# Patient Record
Sex: FEMALE | Race: WHITE | HISPANIC OR LATINO
[De-identification: names, ages, dates, MRNs, and addresses within clinical notes are randomized per-mention and may not be internally consistent; named-entity substitution may affect disease eponyms.]

---

## 2020-06-01 ENCOUNTER — RESULT REVIEW (OUTPATIENT)
Age: 45
End: 2020-06-01

## 2020-07-01 PROBLEM — Z00.00 ENCOUNTER FOR PREVENTIVE HEALTH EXAMINATION: Status: ACTIVE | Noted: 2020-07-01

## 2020-07-06 ENCOUNTER — APPOINTMENT (OUTPATIENT)
Dept: PULMONOLOGY | Facility: CLINIC | Age: 45
End: 2020-07-06
Payer: COMMERCIAL

## 2020-07-06 VITALS
WEIGHT: 127 LBS | BODY MASS INDEX: 23.98 KG/M2 | SYSTOLIC BLOOD PRESSURE: 96 MMHG | OXYGEN SATURATION: 97 % | RESPIRATION RATE: 16 BRPM | DIASTOLIC BLOOD PRESSURE: 61 MMHG | HEIGHT: 61 IN | HEART RATE: 77 BPM

## 2020-07-06 DIAGNOSIS — Z87.42 PERSONAL HISTORY OF OTHER DISEASES OF THE FEMALE GENITAL TRACT: ICD-10-CM

## 2020-07-06 DIAGNOSIS — Z87.39 PERSONAL HISTORY OF OTHER DISEASES OF THE MUSCULOSKELETAL SYSTEM AND CONNECTIVE TISSUE: ICD-10-CM

## 2020-07-06 DIAGNOSIS — R76.12 NONSPECIFIC REACTION TO CELL MEDIATED IMMUNITY MEASUREMENT OF GAMMA INTERFERON ANTIGEN RESPONSE W/OUT ACTIVE TUBERCULOSIS: ICD-10-CM

## 2020-07-06 DIAGNOSIS — Z01.811 ENCOUNTER FOR PREPROCEDURAL RESPIRATORY EXAMINATION: ICD-10-CM

## 2020-07-06 DIAGNOSIS — Z78.9 OTHER SPECIFIED HEALTH STATUS: ICD-10-CM

## 2020-07-06 DIAGNOSIS — G43.909 MIGRAINE, UNSPECIFIED, NOT INTRACTABLE, W/OUT STATUS MIGRAINOSUS: ICD-10-CM

## 2020-07-06 PROCEDURE — 99203 OFFICE O/P NEW LOW 30 MIN: CPT

## 2020-07-06 RX ORDER — ALENDRONATE SODIUM 70 MG/1
70 TABLET ORAL
Qty: 4 | Refills: 0 | Status: ACTIVE | COMMUNITY
Start: 2020-06-12

## 2020-07-06 RX ORDER — UBIDECARENONE/VIT E ACET 100MG-5
CAPSULE ORAL
Refills: 0 | Status: ACTIVE | COMMUNITY

## 2020-07-06 RX ORDER — NORTRIPTYLINE HYDROCHLORIDE 10 MG/1
10 CAPSULE ORAL
Qty: 30 | Refills: 0 | Status: ACTIVE | COMMUNITY
Start: 2020-06-09

## 2020-07-06 RX ORDER — PNV NO.95/FERROUS FUM/FOLIC AC 28MG-0.8MG
TABLET ORAL
Refills: 0 | Status: ACTIVE | COMMUNITY

## 2020-07-06 RX ORDER — SUMATRIPTAN 50 MG/1
50 TABLET, FILM COATED ORAL
Qty: 9 | Refills: 0 | Status: ACTIVE | COMMUNITY
Start: 2020-05-15

## 2020-07-06 RX ORDER — OXYCODONE AND ACETAMINOPHEN 5; 325 MG/1; MG/1
5-325 TABLET ORAL
Qty: 14 | Refills: 0 | Status: ACTIVE | COMMUNITY
Start: 2020-04-15

## 2020-07-06 RX ORDER — OMEPRAZOLE 40 MG/1
40 CAPSULE, DELAYED RELEASE ORAL
Refills: 0 | Status: ACTIVE | COMMUNITY

## 2020-07-06 NOTE — CONSULT LETTER
[Dear  ___] : Dear  [unfilled], [Courtesy Letter:] : I had the pleasure of seeing your patient, [unfilled], in my office today. [Consult Closing:] : Thank you very much for allowing me to participate in the care of this patient.  If you have any questions, please do not hesitate to contact me. [Please see my note below.] : Please see my note below. [DrHitesh  ___] : Dr. MURILLO [Sincerely,] : Sincerely,

## 2020-07-06 NOTE — REASON FOR VISIT
[Consultation] : a consultation [Latent TB/ +PPD/ +IGRA] : latent TB/ +PPD/ +IGRA [Pre-op Risk Stratification] : pre-op risk stratification

## 2020-07-06 NOTE — HISTORY OF PRESENT ILLNESS
[TextBox_4] : 44 yo female quatiferon positive  presents for evaluation prior to ovarian cyst MRI and surgery. The patient was quantiferon positive last year and three years ago, refusing to be treated. Last chest xray performed at [facility] last year was "normal" as per the patient. She denies fever, cough, night sweats , weight loss or hemoptysis.

## 2020-07-06 NOTE — PHYSICAL EXAM
[No Acute Distress] : no acute distress [Normal Oropharynx] : normal oropharynx [No Neck Mass] : no neck mass [Normal Appearance] : normal appearance [Normal Rate/Rhythm] : normal rate/rhythm [No Resp Distress] : no resp distress [Normal S1, S2] : normal s1, s2 [No Murmurs] : no murmurs [Clear to Auscultation Bilaterally] : clear to auscultation bilaterally [Benign] : benign [No Abnormalities] : no abnormalities [Normal Gait] : normal gait [No Cyanosis] : no cyanosis [No Clubbing] : no clubbing [FROM] : FROM [No Edema] : no edema [No Focal Deficits] : no focal deficits [Normal Color/ Pigmentation] : normal color/ pigmentation [Normal Affect] : normal affect [Oriented x3] : oriented x3

## 2020-07-06 NOTE — DISCUSSION/SUMMARY
[FreeTextEntry1] : 44 yo female with positive quantiferon refusing INH treatment. The risks and benefits were discussed with the patient and her  who was present, and both understand. A repeat chest xray is indicated prior to continued evaluation with contrast MRI with steroid pretreatment and possible surgery. She may proceed with the procedures as planned despite her positive quantiferon, if the chest xray results are negative for acute disease. She is to follow up with her GYN and PMD as before.\par \par \par \par \par \par

## 2023-01-14 ENCOUNTER — EMERGENCY (EMERGENCY)
Facility: HOSPITAL | Age: 48
LOS: 1 days | Discharge: HOME | End: 2023-01-14
Attending: EMERGENCY MEDICINE
Payer: COMMERCIAL

## 2023-01-14 VITALS
SYSTOLIC BLOOD PRESSURE: 100 MMHG | OXYGEN SATURATION: 97 % | HEART RATE: 67 BPM | TEMPERATURE: 98 F | RESPIRATION RATE: 18 BRPM | DIASTOLIC BLOOD PRESSURE: 68 MMHG

## 2023-01-14 VITALS
RESPIRATION RATE: 18 BRPM | OXYGEN SATURATION: 97 % | DIASTOLIC BLOOD PRESSURE: 74 MMHG | HEART RATE: 75 BPM | TEMPERATURE: 99 F | WEIGHT: 136.69 LBS | SYSTOLIC BLOOD PRESSURE: 109 MMHG | HEIGHT: 61 IN

## 2023-01-14 PROCEDURE — 99284 EMERGENCY DEPT VISIT MOD MDM: CPT

## 2023-01-14 PROCEDURE — 72100 X-RAY EXAM L-S SPINE 2/3 VWS: CPT | Mod: 26

## 2023-01-14 PROCEDURE — 72100 X-RAY EXAM L-S SPINE 2/3 VWS: CPT

## 2023-01-14 PROCEDURE — 72170 X-RAY EXAM OF PELVIS: CPT

## 2023-01-14 PROCEDURE — 73502 X-RAY EXAM HIP UNI 2-3 VIEWS: CPT

## 2023-01-14 PROCEDURE — 73502 X-RAY EXAM HIP UNI 2-3 VIEWS: CPT | Mod: 26,LT

## 2023-01-14 RX ORDER — LIDOCAINE 4 G/100G
1 CREAM TOPICAL
Qty: 7 | Refills: 0
Start: 2023-01-14 | End: 2023-01-20

## 2023-01-14 RX ORDER — LIDOCAINE 4 G/100G
1 CREAM TOPICAL ONCE
Refills: 0 | Status: COMPLETED | OUTPATIENT
Start: 2023-01-14 | End: 2023-01-14

## 2023-01-14 RX ORDER — IBUPROFEN 200 MG
600 TABLET ORAL ONCE
Refills: 0 | Status: COMPLETED | OUTPATIENT
Start: 2023-01-14 | End: 2023-01-14

## 2023-01-14 RX ADMIN — Medication 600 MILLIGRAM(S): at 10:35

## 2023-01-14 RX ADMIN — Medication 600 MILLIGRAM(S): at 11:41

## 2023-01-14 RX ADMIN — LIDOCAINE 1 PATCH: 4 CREAM TOPICAL at 11:41

## 2023-01-14 NOTE — ED PROVIDER NOTE - PROGRESS NOTE DETAILS
Siomara Logan DO (PGY2): X-ray negative for acute fracture.  Patient ambulating in ED.  Patient symptoms improved with medication.  Plan for discharge with PCP follow-up Pt made aware of lab and imaging results. Questions regarding their symptoms were addressed. Advised to follow up with pcp. Given strict return precautions. Pt verbalized understanding. .

## 2023-01-14 NOTE — ED PROVIDER NOTE - PHYSICAL EXAMINATION
GENERAL: AAOx4, GCS 15, NAD, WDWN   HEENT: MMM, no jugular venous distension, supple neck, PERRLA, EOMI, nonicteric sclera  PULM: CTA B, no crackles/rubs/rales  CV: RRR, S1S2, no MRG  ABD: Flat abdomen, NTND, no R/G/R, no CVAT.    MSK: ESQUIVEL, +2 pulses x4.  Mild midline lower L spine tenderness to palpation without stepoff.  +L paralumbar tenderness to palpation and spasm.  +TTP at L buttok and L posterolateral hip.  Able to range hip though with some pain.  No limitations to range of motion.  No pain at thigh, knee, ankle.  NVI.  Compartments soft.    NEURO: No obvious focal deficits  PSYCH: AAOx3, clear thought and normal sensorium

## 2023-01-14 NOTE — ED PROVIDER NOTE - CLINICAL SUMMARY MEDICAL DECISION MAKING FREE TEXT BOX
Mechanical fall with left hip and lower back injury.  Fracture seems unlikely but given location of pain patient will need x-ray of lumbar spine and left hip/pelvis.  No concern for syncope/seizure related to fall.  [place]/Nexus criteria negative for imaging.  Nontoxic appearing.  No concern for intraabdominal or RP trauma.

## 2023-01-14 NOTE — ED PROVIDER NOTE - NSFOLLOWUPINSTRUCTIONS_ED_ALL_ED_FT
Please follow-up with your primary care doctor within 1 week, if your pain persist you may need physical therapy prescription.  For pain you may take acetaminophen 1000 mg every 6 hours and Motrin 400 mg every 6 hours as needed.  You may leave the lidocaine patch on for 12 hours out of 24 hours.  Do not leave the lidocaine patch on for longer than 12 hours.  Lidocaine patch was sent to your pharmacy please use as prescribed.  A work note is provided.    Fall Prevention    WHAT YOU NEED TO KNOW:    Fall prevention includes ways to make your home and other areas safer. It also includes ways you can move more carefully to prevent a fall. Health conditions that cause changes in your blood pressure, vision, or muscle strength and coordination may increase your risk for falls. Medicines may also increase your risk for falls if they make you dizzy, weak, or sleepy.     DISCHARGE INSTRUCTIONS:    Call 911 or have someone else call if:     You have fallen and are unconscious.      You have fallen and cannot move part of your body.    Contact your healthcare provider if:     You have fallen and have pain or a headache.      You have questions or concerns about your condition or care.    Fall prevention tips:     Stand or sit up slowly. This may help you keep your balance and prevent falls.      Use assistive devices as directed. Your healthcare provider may suggest that you use a cane or walker to help you keep your balance. You may need to have grab bars put in your bathroom near the toilet or in the shower.      Wear shoes that fit well and have soles that . Wear shoes both inside and outside. Use slippers with good . Do not wear shoes with high heels.      Wear a personal alarm. This is a device that allows you to call 911 if you fall and need help. Ask your healthcare provider for more information.      Stay active. Exercise can help strengthen your muscles and improve your balance. Your healthcare provider may recommend water aerobics or walking. He or she may also recommend physical therapy to improve your coordination. Never start an exercise program without talking to your healthcare provider first.       Manage your medical conditions. Keep all appointments with your healthcare providers. Visit your eye doctor as directed.    Home safety tips:     Add items to prevent falls in the bathroom. Put nonslip strips on your bath or shower floor to prevent you from slipping. Use a bath mat if you do not have carpet in the bathroom. This will prevent you from falling when you step out of the bath or shower. Use a shower seat so you do not need to stand while you shower. Sit on the toilet or a chair in your bathroom to dry yourself and put on clothing. This will prevent you from losing your balance from drying or dressing yourself while you are standing.       Keep paths clear. Remove books, shoes, and other objects from walkways and stairs. Place cords for telephones and lamps out of the way so that you do not need to walk over them. Tape them down if you cannot move them. Remove small rugs. If you cannot remove a rug, secure it with double-sided tape. This will prevent you from tripping.       Install bright lights in your home. Use night lights to help light paths to the bathroom or kitchen. Always turn on the light before you start walking.      Keep items you use often on shelves within reach. Do not use a step stool to help you reach an item.      Paint or place reflective tape on the edges of your stairs. This will help you see the stairs better.    Follow up with your healthcare provider as directed: Write down your questions so you remember to ask them during your visits.

## 2023-01-14 NOTE — ED PROVIDER NOTE - OBJECTIVE STATEMENT
47-year-old woman with a history of osteoporosis and dyslipidemia with left hip pain after fall from approximately 3 feet in the air.  Patient states she was sleeping.  Rolled out of bed.  Fell to the ground.  She is is able to ambulate but with pain.  Pain is primarily left lower back and left hip.  It does not radiate.  There are no injuries otherwise.  She has no pain in her head or neck.  No chest pain.  No upper back pain.  No abdominal pain.  Has taken only Tylenol for pain.  She has no paresthesias or weakness in her extremities.  No lightheadedness, chest pain, difficulty breathing prior to or after her fall.

## 2023-01-14 NOTE — ED PROVIDER NOTE - PATIENT PORTAL LINK FT
You can access the FollowMyHealth Patient Portal offered by [facility] by registering at the following website: [email or web address]. By joining [employer]’s FollowMyHealth portal, you will also be able to view your health information using other applications (apps) compatible with our system.

## 2023-08-16 ENCOUNTER — APPOINTMENT (OUTPATIENT)
Dept: OBGYN | Facility: CLINIC | Age: 48
End: 2023-08-16

## 2023-09-15 ENCOUNTER — APPOINTMENT (OUTPATIENT)
Dept: OBGYN | Facility: CLINIC | Age: 48
End: 2023-09-15

## 2023-11-07 PROBLEM — E78.5 HYPERLIPIDEMIA, UNSPECIFIED: Chronic | Status: ACTIVE | Noted: 2023-01-14

## 2023-11-07 PROBLEM — M81.0 AGE-RELATED OSTEOPOROSIS WITHOUT CURRENT PATHOLOGICAL FRACTURE: Chronic | Status: ACTIVE | Noted: 2023-01-14

## 2023-11-15 ENCOUNTER — APPOINTMENT (OUTPATIENT)
Dept: OBGYN | Facility: CLINIC | Age: 48
End: 2023-11-15

## 2023-11-26 ENCOUNTER — NON-APPOINTMENT (OUTPATIENT)
Age: 48
End: 2023-11-26

## 2023-11-28 ENCOUNTER — NON-APPOINTMENT (OUTPATIENT)
Age: 48
End: 2023-11-28

## 2024-03-21 ENCOUNTER — NON-APPOINTMENT (OUTPATIENT)
Age: 49
End: 2024-03-21

## 2024-03-24 ENCOUNTER — EMERGENCY (EMERGENCY)
Facility: HOSPITAL | Age: 49
LOS: 1 days | Discharge: HOME | End: 2024-03-24
Attending: EMERGENCY MEDICINE
Payer: COMMERCIAL

## 2024-03-24 VITALS
TEMPERATURE: 97 F | WEIGHT: 127.87 LBS | DIASTOLIC BLOOD PRESSURE: 76 MMHG | HEIGHT: 62 IN | OXYGEN SATURATION: 96 % | HEART RATE: 89 BPM | SYSTOLIC BLOOD PRESSURE: 104 MMHG | RESPIRATION RATE: 18 BRPM

## 2024-03-24 VITALS
OXYGEN SATURATION: 99 % | RESPIRATION RATE: 18 BRPM | DIASTOLIC BLOOD PRESSURE: 70 MMHG | TEMPERATURE: 98 F | SYSTOLIC BLOOD PRESSURE: 111 MMHG | HEART RATE: 74 BPM

## 2024-03-24 LAB
FLUAV AG NPH QL: SIGNIFICANT CHANGE UP
FLUBV AG NPH QL: SIGNIFICANT CHANGE UP
RSV RNA NPH QL NAA+NON-PROBE: SIGNIFICANT CHANGE UP
SARS-COV-2 RNA SPEC QL NAA+PROBE: SIGNIFICANT CHANGE UP

## 2024-03-24 PROCEDURE — 99284 EMERGENCY DEPT VISIT MOD MDM: CPT | Mod: 25

## 2024-03-24 PROCEDURE — 94640 AIRWAY INHALATION TREATMENT: CPT

## 2024-03-24 PROCEDURE — 87637 SARSCOV2&INF A&B&RSV AMP PRB: CPT

## 2024-03-24 PROCEDURE — 99283 EMERGENCY DEPT VISIT LOW MDM: CPT | Mod: 25

## 2024-03-24 RX ORDER — ALBUTEROL 90 UG/1
2 AEROSOL, METERED ORAL ONCE
Refills: 0 | Status: COMPLETED | OUTPATIENT
Start: 2024-03-24 | End: 2024-03-24

## 2024-03-24 RX ORDER — ACETAMINOPHEN 500 MG
1000 TABLET ORAL ONCE
Refills: 0 | Status: COMPLETED | OUTPATIENT
Start: 2024-03-24 | End: 2024-03-24

## 2024-03-24 RX ADMIN — Medication 1000 MILLIGRAM(S): at 07:45

## 2024-03-24 RX ADMIN — ALBUTEROL 2 PUFF(S): 90 AEROSOL, METERED ORAL at 07:45

## 2024-03-24 NOTE — ED ADULT NURSE NOTE - NSFALLUNIVINTERV_ED_ALL_ED
Bed/Stretcher in lowest position, wheels locked, appropriate side rails in place/Call bell, personal items and telephone in reach/Instruct patient to call for assistance before getting out of bed/chair/stretcher/Non-slip footwear applied when patient is off stretcher/[place] to call system/Physically safe environment - no spills, clutter or unnecessary equipment/Purposeful proactive rounding/Room/bathroom lighting operational, light cord in reach

## 2024-03-24 NOTE — ED PROVIDER NOTE - NSFOLLOWUPINSTRUCTIONS_ED_ALL_ED_FT
1. You presented to the emergency department for: upper viral illness. Please see the attached information sheet for more information regarding upper respiratory viral illness. There is no medication that will make all your symptoms disappear. Gold standard treatment for viral illness is rest, hydration, and other supportive care including tylenol for discomfort/pain.    2. Your evaluation in the emergency department included a physician evaluation. Your work-up did not reveal any findings indicating the need for admission to the hospital or any emergent interventions at this time.     3. It is recommended that you follow-up with your primary care doctor for a repeat evaluation within the next 1-2 weeks.     If needed, to arrange an appointment with a primary care provider please call: [phone]    4. Please continue taking your regular medications as prescribed.     For pain you may take 400-600 mg IBUPROFEN or 500-1000mg ACETAMINOPHEN every 6-8 hours - as needed.  This is an over-the-counter medication - please read the instructions for use and warnings on the label. If you have any questions regarding its use, you may refer them to your local pharmacist.    5. PLEASE RETURN TO THE EMERGENCY DEPARTMENT IMMEDIATELY IF you develop any fevers not responding to over the counter medications, uncontrollable nausea and vomiting, an inability to tolerate eating and drinking, difficulty breathing, chest pain, a severe increase in your symptoms or pain, or any other new symptoms that concern you.

## 2024-03-24 NOTE — ED ADULT TRIAGE NOTE - CHIEF COMPLAINT QUOTE
fever, cough, body aches, ear ache, headache, vomit x 3 days; seen in urgent care given Fluticasone and Benzonatate; no improvement

## 2024-03-24 NOTE — ED PROVIDER NOTE - ATTENDING CONTRIBUTION TO CARE
Attending MD Carlin:  I have seen and examined this patient and fully participated in the care of this patient as the teaching attending. I personally made/approved the management plan and take responsibility for the patient management.      49-year-old woman is presenting for evaluation of 3 days of rhinorrhea cough fever right ear pain.  Patient reports she was seen in urgent care during the initial phase of illness and had a negative COVID swab, she was started on fluticasone and Tessalon Perles, symptoms are not improving with these medications.  Cough is nonproductive, there is no shortness of breath.    Patient's vital signs are within normal limits.  She is sitting up in the stretcher in no apparent distress, breathing comfortably 100% on room air.  Pulmonary examination with clear lungs posteriorly regular heart sounds neck is supple nontender.  Posterior oropharynx without tonsillar hypertrophy exudates or erythema.  Tympanic membranes are clear bilaterally.  There is no mastoid tenderness bilaterally the abdomen is nontender extremities warm and well-perfused moves all extremities spontaneously.    Patient presenting for evaluation of rhinorrhea cough right ear pain for 3 days, she is not immunocompromised.  Symptom complex is consistent with likely viral syndrome.  No evidence of superimposed acute bacterial infection at this time.  Plan to continue supportive care with antitussives analgesia and will obtain screening flu/COVID/RSV swab.      *The above represents an initial assessment/impression. Please refer to progress notes for potential changes in patient clinical course*

## 2024-03-24 NOTE — ED PROVIDER NOTE - CLINICAL SUMMARY MEDICAL DECISION MAKING FREE TEXT BOX
49-year-old female with only daily med HRT presents with complaints of cough, sore throat, congestion, ear pain, myalgias. Patient is extremely well-appearing on exam, speaking in full sentences.  Lungs clear, abdomen soft nontender. Vitals stable  No fluid losses, tolerating p.o. Pt likely has viral URI. Very low suspicion for PNA or other serious bacterial infection in absence of concerning history/exam findings/vitals. Labs/CHX unlikely to lead to any changes in management. Will give tylenol, albuterol. Will educate regarding effective use of tylenol and expectations regarding viral illnesses. Likely dispo home. Will reassess after meds. 49-year-old female with only daily med HRT presents with complaints of cough, rhinorrhea sore throat, congestion, ear pain, myalgias. No fluid losses, tolerating p.o. Patient is extremely well-appearing on exam, speaking in full sentences.  Lungs clear, abdomen soft nontender. Vitals stable, afebrile.   Pt likely has viral URI. Very low suspicion for PNA, bacterial ear infection, or other serious bacterial infection in absence of concerning history/exam findings/vitals. Labs/CHX unlikely to lead to any changes in management. Will give tylenol, albuterol. Will educate regarding effective use of tylenol and expectations regarding viral illnesses. Likely dispo home. Will reassess after meds. 49-year-old female with only daily med HRT presents with complaints of cough, rhinorrhea sore throat, congestion, ear pain, myalgias. No fluid losses, tolerating p.o. Patient is extremely well-appearing on exam, speaking in full sentences.  Lungs clear, abdomen soft nontender. Vitals stable, afebrile.   Pt likely has viral URI. Very low suspicion for PNA, bacterial ear infection, or other serious infection in absence of concerning history/exam findings/vitals. Labs/CHX unlikely to lead to any changes in management. Will give tylenol, albuterol. Will educate regarding effective use of tylenol and expectations regarding viral illnesses. Likely dispo home. Will reassess after meds.

## 2024-03-24 NOTE — ED PROVIDER NOTE - PHYSICAL EXAMINATION
No
Gen: NAD, AAOx3, non-toxic appearing  HEENT: NCAT, normal conjunctiva, oral mucosa moist  Lung: speaking in full sentences, good aeration bilaterally, lungs CTA b/l  CV: regular rate and rhythm. cap refill <2x. peripheral pulses 2+bilaterally   Abd: soft, ND, NT  MSK: no visible deformities  Neuro: No focal deficits  Skin: Intact  Psych: normal affect

## 2024-03-24 NOTE — ED PROVIDER NOTE - PATIENT PORTAL LINK FT
You can access the FollowMyHealth Patient Portal offered by [facility] by registering at the following website: [email or web address]. By joining [employer]’s FollowMyHealth portal, you will also be able to view your health information using other applications (apps) compatible with our system.

## 2024-03-24 NOTE — ED PROVIDER NOTE - PROGRESS NOTE DETAILS
Dacia Servin (PGY1): Reassessed patient and still doing well. Educated regarding tylenol use and expectations for viral illnesses, and how to best maintain supportive care at home. pt understands, is ambulatory, Dacia Servin (PGY1): Reassessed patient and still doing well. Educated regarding tylenol use and expectations for viral illnesses, and how to best maintain supportive care at home. pt understands, is ambulatory, and agreeable to dispo home. Return precautions given.

## 2024-03-24 NOTE — ED ADULT NURSE NOTE - OBJECTIVE STATEMENT
50yo female with pmh of osteopetrosis presents to ED c/o flu like symptoms.  Pt states symptoms started on Friday and endorses bilateral ear pain, sore throat, and body aches. Pt denies, chest pain, and SOB, diarrhea, urinary symptoms, and blood in  stool.  Pt presented to urgent care and tested negative for flu.

## 2024-03-24 NOTE — ED PROVIDER NOTE - OBJECTIVE STATEMENT
49-year-old female with PMH of migraines, TERESA, HLD no daily meds presents to the ED with complaints of cough, sore throat, myalgias since Friday. Despite triage note, patient has had no vomiting.  No diarrhea.  Patient has been tolerating p.o. and has good appetite.  Patient states that she went to urgent care and received fluticasone and benzonatate but has had no improvement in cough.  Patient has only taken 1 Tylenol 1 time on Friday.  She she presents today because cough is still present.  Patient has had no measured fevers at home and has no fevers.  Patient endorsing bilateral ear pain worse on the right side.  Patient denies shortness of breath, difficulty breathing, chest pain, abdominal pain, dysuria. 49-year-old female with PMH of migraines, TERESA, HLD only daily med HRT postmenopause presents to the ED with complaints of cough, sore throat, myalgias since Friday. Despite triage note, patient has had no vomiting.  No diarrhea.  Patient has been tolerating p.o. and has good appetite.  Patient states that she went to urgent care and received fluticasone and benzonatate but has had no improvement in cough.  Patient has only taken 1 Tylenol 1 time on Friday.  She she presents today because cough is still present.  Patient has had no measured fevers at home and has no fevers.  Patient endorsing bilateral ear pain worse on the right side.  Patient denies shortness of breath, difficulty breathing, chest pain, abdominal pain, dysuria, abnormal vaginal discharge.

## 2024-10-11 ENCOUNTER — EMERGENCY (EMERGENCY)
Facility: HOSPITAL | Age: 49
LOS: 1 days | Discharge: HOME | End: 2024-10-11
Attending: EMERGENCY MEDICINE
Payer: COMMERCIAL

## 2024-10-11 VITALS
WEIGHT: 132.28 LBS | DIASTOLIC BLOOD PRESSURE: 86 MMHG | TEMPERATURE: 98 F | HEIGHT: 57 IN | SYSTOLIC BLOOD PRESSURE: 129 MMHG | OXYGEN SATURATION: 99 % | RESPIRATION RATE: 16 BRPM | HEART RATE: 73 BPM

## 2024-10-11 VITALS
TEMPERATURE: 98 F | RESPIRATION RATE: 18 BRPM | SYSTOLIC BLOOD PRESSURE: 110 MMHG | HEART RATE: 61 BPM | DIASTOLIC BLOOD PRESSURE: 75 MMHG | OXYGEN SATURATION: 99 %

## 2024-10-11 LAB
ALBUMIN SERPL ELPH-MCNC: 4.4 G/DL — SIGNIFICANT CHANGE UP (ref 3.3–5)
ALP SERPL-CCNC: 55 U/L — SIGNIFICANT CHANGE UP (ref 40–120)
ALT FLD-CCNC: 9 U/L — LOW (ref 10–45)
ANION GAP SERPL CALC-SCNC: 11 MMOL/L — SIGNIFICANT CHANGE UP (ref 5–17)
APPEARANCE UR: CLEAR — SIGNIFICANT CHANGE UP
AST SERPL-CCNC: 17 U/L — SIGNIFICANT CHANGE UP (ref 10–40)
BACTERIA # UR AUTO: NEGATIVE /HPF — SIGNIFICANT CHANGE UP
BASOPHILS # BLD AUTO: 0.06 K/UL — SIGNIFICANT CHANGE UP (ref 0–0.2)
BASOPHILS NFR BLD AUTO: 0.7 % — SIGNIFICANT CHANGE UP (ref 0–2)
BILIRUB SERPL-MCNC: 0.3 MG/DL — SIGNIFICANT CHANGE UP (ref 0.2–1.2)
BILIRUB UR-MCNC: NEGATIVE — SIGNIFICANT CHANGE UP
BUN SERPL-MCNC: 11 MG/DL — SIGNIFICANT CHANGE UP (ref 7–23)
CALCIUM SERPL-MCNC: 10 MG/DL — SIGNIFICANT CHANGE UP (ref 8.4–10.5)
CAST: 0 /LPF — SIGNIFICANT CHANGE UP (ref 0–4)
CHLORIDE SERPL-SCNC: 106 MMOL/L — SIGNIFICANT CHANGE UP (ref 96–108)
CO2 SERPL-SCNC: 22 MMOL/L — SIGNIFICANT CHANGE UP (ref 22–31)
COLOR SPEC: YELLOW — SIGNIFICANT CHANGE UP
CREAT SERPL-MCNC: 0.66 MG/DL — SIGNIFICANT CHANGE UP (ref 0.5–1.3)
DIFF PNL FLD: ABNORMAL
EGFR: 108 ML/MIN/1.73M2 — SIGNIFICANT CHANGE UP
EOSINOPHIL # BLD AUTO: 0.12 K/UL — SIGNIFICANT CHANGE UP (ref 0–0.5)
EOSINOPHIL NFR BLD AUTO: 1.3 % — SIGNIFICANT CHANGE UP (ref 0–6)
GAS PNL BLDV: SIGNIFICANT CHANGE UP
GLUCOSE SERPL-MCNC: 101 MG/DL — HIGH (ref 70–99)
GLUCOSE UR QL: NEGATIVE MG/DL — SIGNIFICANT CHANGE UP
HCT VFR BLD CALC: 43.1 % — SIGNIFICANT CHANGE UP (ref 34.5–45)
HGB BLD-MCNC: 13.5 G/DL — SIGNIFICANT CHANGE UP (ref 11.5–15.5)
IMM GRANULOCYTES NFR BLD AUTO: 0.2 % — SIGNIFICANT CHANGE UP (ref 0–0.9)
KETONES UR-MCNC: NEGATIVE MG/DL — SIGNIFICANT CHANGE UP
LEUKOCYTE ESTERASE UR-ACNC: NEGATIVE — SIGNIFICANT CHANGE UP
LIDOCAIN IGE QN: 43 U/L — SIGNIFICANT CHANGE UP (ref 7–60)
LYMPHOCYTES # BLD AUTO: 3.52 K/UL — HIGH (ref 1–3.3)
LYMPHOCYTES # BLD AUTO: 39.5 % — SIGNIFICANT CHANGE UP (ref 13–44)
MCHC RBC-ENTMCNC: 30.5 PG — SIGNIFICANT CHANGE UP (ref 27–34)
MCHC RBC-ENTMCNC: 31.3 GM/DL — LOW (ref 32–36)
MCV RBC AUTO: 97.5 FL — SIGNIFICANT CHANGE UP (ref 80–100)
MONOCYTES # BLD AUTO: 0.49 K/UL — SIGNIFICANT CHANGE UP (ref 0–0.9)
MONOCYTES NFR BLD AUTO: 5.5 % — SIGNIFICANT CHANGE UP (ref 2–14)
NEUTROPHILS # BLD AUTO: 4.7 K/UL — SIGNIFICANT CHANGE UP (ref 1.8–7.4)
NEUTROPHILS NFR BLD AUTO: 52.8 % — SIGNIFICANT CHANGE UP (ref 43–77)
NITRITE UR-MCNC: NEGATIVE — SIGNIFICANT CHANGE UP
NRBC # BLD: 0 /100 WBCS — SIGNIFICANT CHANGE UP (ref 0–0)
PH UR: 6 — SIGNIFICANT CHANGE UP (ref 5–8)
PLATELET # BLD AUTO: 276 K/UL — SIGNIFICANT CHANGE UP (ref 150–400)
POTASSIUM SERPL-MCNC: 4.4 MMOL/L — SIGNIFICANT CHANGE UP (ref 3.5–5.3)
POTASSIUM SERPL-SCNC: 4.4 MMOL/L — SIGNIFICANT CHANGE UP (ref 3.5–5.3)
PROT SERPL-MCNC: 7.7 G/DL — SIGNIFICANT CHANGE UP (ref 6–8.3)
PROT UR-MCNC: NEGATIVE MG/DL — SIGNIFICANT CHANGE UP
RBC # BLD: 4.42 M/UL — SIGNIFICANT CHANGE UP (ref 3.8–5.2)
RBC # FLD: 13 % — SIGNIFICANT CHANGE UP (ref 10.3–14.5)
RBC CASTS # UR COMP ASSIST: 2 /HPF — SIGNIFICANT CHANGE UP (ref 0–4)
SODIUM SERPL-SCNC: 139 MMOL/L — SIGNIFICANT CHANGE UP (ref 135–145)
SP GR SPEC: 1.01 — SIGNIFICANT CHANGE UP (ref 1–1.03)
SQUAMOUS # UR AUTO: 5 /HPF — SIGNIFICANT CHANGE UP (ref 0–5)
UROBILINOGEN FLD QL: 0.2 MG/DL — SIGNIFICANT CHANGE UP (ref 0.2–1)
WBC # BLD: 8.91 K/UL — SIGNIFICANT CHANGE UP (ref 3.8–10.5)
WBC # FLD AUTO: 8.91 K/UL — SIGNIFICANT CHANGE UP (ref 3.8–10.5)
WBC UR QL: 0 /HPF — SIGNIFICANT CHANGE UP (ref 0–5)

## 2024-10-11 PROCEDURE — 85014 HEMATOCRIT: CPT

## 2024-10-11 PROCEDURE — 85018 HEMOGLOBIN: CPT

## 2024-10-11 PROCEDURE — 80053 COMPREHEN METABOLIC PANEL: CPT

## 2024-10-11 PROCEDURE — 82435 ASSAY OF BLOOD CHLORIDE: CPT

## 2024-10-11 PROCEDURE — 76770 US EXAM ABDO BACK WALL COMP: CPT

## 2024-10-11 PROCEDURE — 85025 COMPLETE CBC W/AUTO DIFF WBC: CPT

## 2024-10-11 PROCEDURE — 82330 ASSAY OF CALCIUM: CPT

## 2024-10-11 PROCEDURE — 81001 URINALYSIS AUTO W/SCOPE: CPT

## 2024-10-11 PROCEDURE — 99284 EMERGENCY DEPT VISIT MOD MDM: CPT

## 2024-10-11 PROCEDURE — 84295 ASSAY OF SERUM SODIUM: CPT

## 2024-10-11 PROCEDURE — 82947 ASSAY GLUCOSE BLOOD QUANT: CPT

## 2024-10-11 PROCEDURE — 96374 THER/PROPH/DIAG INJ IV PUSH: CPT

## 2024-10-11 PROCEDURE — 83605 ASSAY OF LACTIC ACID: CPT

## 2024-10-11 PROCEDURE — 76770 US EXAM ABDO BACK WALL COMP: CPT | Mod: 26

## 2024-10-11 PROCEDURE — 83690 ASSAY OF LIPASE: CPT

## 2024-10-11 PROCEDURE — 84132 ASSAY OF SERUM POTASSIUM: CPT

## 2024-10-11 PROCEDURE — 87086 URINE CULTURE/COLONY COUNT: CPT

## 2024-10-11 PROCEDURE — 36415 COLL VENOUS BLD VENIPUNCTURE: CPT

## 2024-10-11 PROCEDURE — 99284 EMERGENCY DEPT VISIT MOD MDM: CPT | Mod: 25

## 2024-10-11 PROCEDURE — 82803 BLOOD GASES ANY COMBINATION: CPT

## 2024-10-11 RX ORDER — ACETAMINOPHEN 325 MG
975 TABLET ORAL ONCE
Refills: 0 | Status: COMPLETED | OUTPATIENT
Start: 2024-10-11 | End: 2024-10-11

## 2024-10-11 RX ORDER — KETOROLAC TROMETHAMINE 10 MG/1
15 TABLET, FILM COATED ORAL ONCE
Refills: 0 | Status: DISCONTINUED | OUTPATIENT
Start: 2024-10-11 | End: 2024-10-11

## 2024-10-11 RX ADMIN — Medication 250 MILLIGRAM(S): at 23:27

## 2024-10-11 RX ADMIN — KETOROLAC TROMETHAMINE 15 MILLIGRAM(S): 10 TABLET, FILM COATED ORAL at 23:27

## 2024-10-11 RX ADMIN — Medication 975 MILLIGRAM(S): at 23:27

## 2024-10-11 NOTE — ED ADULT NURSE NOTE - OBJECTIVE STATEMENT
50 y/o F A&Ox4 with PMH of Kidney stones and UTIs presents to the ED c/o urinary symptoms. Pt reports she was in [place] 3 days ago when she began having urinary symptoms; pt endorses burning with urination and urinary frequency. Pt reports she bought medications while in [place] however, denies improvement in symptoms and reports having pain to L flank now. Denies fever, chills, vaginal discharge or bleeding. Denies chest pain, SOB, n/v/d, lightheadedness, dizziness. IV access established via qdoc RN. Patient safety maintained, bed is in lowest position, wheels locked, and side rails raised.

## 2024-10-11 NOTE — ED PROVIDER NOTE - PHYSICAL EXAMINATION
NAD. VSS, Afeb NAD. VSS, Afebrile. Neck supple. Lungs clear. ABD soft, non tender. +Left cva tender. Neuro- intact,

## 2024-10-11 NOTE — ED ADULT TRIAGE NOTE - CHIEF COMPLAINT QUOTE
Was in [place] 3 days ago when UTI (burning with urination) like symptoms began, started medications while in [place] which helped symptoms, flew back today and now reporting pain to left flank region.

## 2024-10-11 NOTE — ED PROVIDER NOTE - NSFOLLOWUPCLINICS_GEN_ALL_ED_FT
[facility] Gynecology and Obstetrics  Gynceology/OB  [address] [place] [address]  Phone: [phone]  Fax:     [facility] Specialty Clinics  Urology  [address] [place] [id number]  Phone: [phone]  Fax:

## 2024-10-11 NOTE — ED PROVIDER NOTE - ATTENDING APP SHARED VISIT CONTRIBUTION OF CARE
Attending MD Carlin: I personally made/approved the management plan and take responsibility for the patient management.

## 2024-10-11 NOTE — ED ADULT NURSE NOTE - NSFALLRISK_ED_ALL_ED
Patient presents with c/o feeling that her ears are clogged. Reports she's had ringing in her ears; uses q-tips regularly. Last night used debrox and feels like it's still clogged.
No

## 2024-10-11 NOTE — ED PROVIDER NOTE - CLINICAL SUMMARY MEDICAL DECISION MAKING FREE TEXT BOX
Attending MD Carlin:  49-year-old woman with history of kidney stones presenting for evaluation of several days of burning with urination and frequent urination left-sided back and flank pain.  No hematuria.  No vaginal bleeding or discharge.  Patient was in [place] and was prescribed several medications for the symptoms which she provides to us, one of the medication seems to be a vaginal suppository the others may be a probiotic in the last 1 I am not certain what the medication is.  Patient has not taken any other medications as of yet for this.  She reports her pain level at this time is a 7/10.  She has never required any interventions for her kidney stones in the past.    Patient's vital signs are within normal limits.  The patient sitting the stretcher no apparent distress.  Abdominal examination soft nondistended mild suprapubic tenderness to palpation.  No CVA tenderness bilaterally.  Please see NP note for details of pelvic examination.    Patient underwent lab work through triage with normal white blood cell count, urinalysis is negative for pyuria or bacteriuria.  Ultrasound kidney and bladder negative for hydronephrosis.  Given prominent urinary symptoms, will treat presumptively for possible infectious cystitis.  Symptoms could still represent small kidney stone however not visualized on ultrasound.  Will pursue analgesia prescribe oral antibiotics and refer patient to urology for outpatient reassessment.          *The above represents an initial assessment/impression. Please refer to progress notes for potential changes in patient clinical course*

## 2024-10-11 NOTE — ED PROVIDER NOTE - OBJECTIVE STATEMENT
50yo female with PMHx of UTIs and Kidney stone presents to ED with dysuria, frequent urination, and left flank pain for 3days. Reports was in [place] and was prescribed multiple meds. Pt reports no improvement at all with the medications. Denies fever, chills, or recent cold symptoms. Denies N/V/D. Denies vaginal discharge or bleeding. LMP- 2 years ago. Denies CP/SOB or leg pain.

## 2024-10-11 NOTE — ED PROVIDER NOTE - CPE EDP SKIN NORM
No lesions,  no deformities, breathing is unlabored without accessory muscle use, normal breath sounds normal...

## 2024-10-11 NOTE — ED PROVIDER NOTE - RAPID ASSESSMENT
50 yo female pmhx UTIs presents to ED c/o 3 days of dysuria and pain in L flank. Sxs first started with mild burning with urination, was in [place] when sxs started and was given a vaginal cream which she's been taking without relief. Since onset now having pain over bladder region and to L flank. Mild nausea, no vomiting. Denies fever/chills, hematuria, cp, sob, diarrhea.     **Patient was rapidly assessed by Herber osei Kearney, PA-C. A limited history was obtained. The patient was made aware that they are still awaiting to be seen, examined and further evaluated/worked up in the main ED and their care will be completed by the main ED team. Receiving team will follow up on labs, analgesia, any clinical imaging, and perform reassessment and disposition of the patient as clinically indicated. All decisions regarding the progression of care will be made at their discretion.

## 2024-10-11 NOTE — ED PROVIDER NOTE - PATIENT PORTAL LINK FT
You can access the FollowMyHealth Patient Portal offered by [facility] by registering at the following website: [email or web address]. By joining [employer]’s FollowMyHealth portal, you will also be able to view your health information using other applications (apps) compatible with our system.

## 2024-10-11 NOTE — ED PROVIDER NOTE - NSFOLLOWUPINSTRUCTIONS_ED_ALL_ED_FT
Please see the information of Kidney stone and UTI (urinary track infection).    Hydrate.    Take Ceftin as prescribed.    Take Ibuprofen (400mg every 6-8hours with food) or Tylenol (2 tablets of 500mg every 8hours) as needed for pain.    Follow up with urology clinic ([phone]) for reevaluation, call Monday for appointment.  Follow up with your GYN or GYN clinic ([phone]) for reevaluation, call Monday for appointment.    Return for any concerns, fever, chills, abdominal pain, vomiting, or worsening symptoms.

## 2024-10-13 LAB
CULTURE RESULTS: SIGNIFICANT CHANGE UP
SPECIMEN SOURCE: SIGNIFICANT CHANGE UP

## 2024-10-28 DIAGNOSIS — R30.0 DYSURIA: ICD-10-CM

## 2024-10-28 DIAGNOSIS — R39.9 UNSPECIFIED SYMPTOMS AND SIGNS INVOLVING THE GENITOURINARY SYSTEM: ICD-10-CM

## 2024-10-30 ENCOUNTER — APPOINTMENT (OUTPATIENT)
Dept: UROLOGY | Facility: CLINIC | Age: 49
End: 2024-10-30

## 2024-12-19 ENCOUNTER — NON-APPOINTMENT (OUTPATIENT)
Age: 49
End: 2024-12-19

## 2025-02-02 ENCOUNTER — NON-APPOINTMENT (OUTPATIENT)
Age: 50
End: 2025-02-02